# Patient Record
Sex: FEMALE | Race: OTHER | HISPANIC OR LATINO | URBAN - METROPOLITAN AREA
[De-identification: names, ages, dates, MRNs, and addresses within clinical notes are randomized per-mention and may not be internally consistent; named-entity substitution may affect disease eponyms.]

---

## 2022-05-12 ENCOUNTER — OFFICE (OUTPATIENT)
Dept: URBAN - METROPOLITAN AREA CLINIC 79 | Facility: CLINIC | Age: 52
End: 2022-05-12
Payer: MEDICAID

## 2022-05-12 VITALS
HEART RATE: 77 BPM | TEMPERATURE: 97 F | DIASTOLIC BLOOD PRESSURE: 85 MMHG | WEIGHT: 174 LBS | SYSTOLIC BLOOD PRESSURE: 128 MMHG | HEIGHT: 62 IN

## 2022-05-12 DIAGNOSIS — K62.5 HEMORRHAGE OF ANUS AND RECTUM: ICD-10-CM

## 2022-05-12 DIAGNOSIS — Z80.0 FAMILY HISTORY OF MALIGNANT NEOPLASM OF DIGESTIVE ORGANS: ICD-10-CM

## 2022-05-12 DIAGNOSIS — K59.09 OTHER CONSTIPATION: ICD-10-CM

## 2022-05-12 PROCEDURE — 99204 OFFICE O/P NEW MOD 45 MIN: CPT | Performed by: PHYSICIAN ASSISTANT

## 2022-05-12 NOTE — SERVICEHPINOTES
ANGELIKA DURANT   is a   51   year old   white  female who is being seen in consultation at the request of   MARVIN NASH   for ov prior to colonoscopy. No prior colonoscopy. 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 She also notes intermittent episodes of BRBPR seen on wipe: Events once every few weeks. Rare NSAID use. Denies chest pain, n/v, abdominal pain, change in BMs, melena, weight loss. No other complaints. 
 She informs of recent CCY at [facility] in 04/2022 due to GB stones. br 
Last colonoscopy in 2016 [place] due to fm h/o CRC in sister  onset age 48 (no records).  kenneth
br

## 2022-09-15 ENCOUNTER — OFFICE (OUTPATIENT)
Dept: URBAN - METROPOLITAN AREA CLINIC 79 | Facility: CLINIC | Age: 52
End: 2022-09-15

## 2022-09-15 PROCEDURE — 00031: CPT | Performed by: INTERNAL MEDICINE

## 2022-09-22 ENCOUNTER — ON CAMPUS - OUTPATIENT (OUTPATIENT)
Dept: URBAN - METROPOLITAN AREA HOSPITAL 65 | Facility: HOSPITAL | Age: 52
End: 2022-09-22
Payer: COMMERCIAL

## 2022-09-22 DIAGNOSIS — Z80.0 FAMILY HISTORY OF MALIGNANT NEOPLASM OF DIGESTIVE ORGANS: ICD-10-CM

## 2022-09-22 DIAGNOSIS — K63.5 POLYP OF COLON: ICD-10-CM

## 2022-09-22 DIAGNOSIS — Z12.11 ENCOUNTER FOR SCREENING FOR MALIGNANT NEOPLASM OF COLON: ICD-10-CM

## 2022-09-22 DIAGNOSIS — D12.0 BENIGN NEOPLASM OF CECUM: ICD-10-CM

## 2022-09-22 PROCEDURE — 45380 COLONOSCOPY AND BIOPSY: CPT | Mod: PT | Performed by: INTERNAL MEDICINE

## 2022-11-29 ENCOUNTER — OFFICE (OUTPATIENT)
Dept: URBAN - METROPOLITAN AREA CLINIC 79 | Facility: CLINIC | Age: 52
End: 2022-11-29
Payer: COMMERCIAL

## 2022-11-29 VITALS
DIASTOLIC BLOOD PRESSURE: 87 MMHG | HEART RATE: 93 BPM | HEIGHT: 62 IN | SYSTOLIC BLOOD PRESSURE: 149 MMHG | WEIGHT: 175 LBS | TEMPERATURE: 96.1 F

## 2022-11-29 DIAGNOSIS — R63.5 ABNORMAL WEIGHT GAIN: ICD-10-CM

## 2022-11-29 DIAGNOSIS — R10.31 RIGHT LOWER QUADRANT PAIN: ICD-10-CM

## 2022-11-29 DIAGNOSIS — Z90.49 ACQUIRED ABSENCE OF OTHER SPECIFIED PARTS OF DIGESTIVE TRACT: ICD-10-CM

## 2022-11-29 DIAGNOSIS — R10.11 RIGHT UPPER QUADRANT PAIN: ICD-10-CM

## 2022-11-29 PROCEDURE — 99214 OFFICE O/P EST MOD 30 MIN: CPT

## 2022-11-29 RX ORDER — DICYCLOMINE HYDROCHLORIDE 20 MG/1
TABLET ORAL
Qty: 30 | Refills: 1 | Status: ACTIVE
Start: 2022-11-29

## 2022-11-29 NOTE — SERVICEHPINOTES
53 y/o female c/o abdominal pain. Patient reports this is an ongoing problem since the beginning of this year. Describes a "nagging" discomfort on R side of abdomen. No correlation with po intake or bowel habits, which are very regular 3 stools daily, formed. Tried omeprazole without improvement. No heartburn, acid regurgitation, nausea or vomiting. No fevers.
br She notes ccy due to stones in April, was told that would resolve pain but it did not. In fact, she feels like she has new issues with certain foods since the surgery. She pursued a colonoscopy hoping that would give her answers (9/2022) this revealed small internal hemorrhoids, mild sigmoid diverticulosis, and 2 TAs. She is also concerned about recent weight gain and wonders if it is related to pain. No recent imaging. PCP ordered CMP, lipase and amylase in September unremarkable.kenneth
